# Patient Record
Sex: MALE | Race: ASIAN | Employment: FULL TIME | URBAN - METROPOLITAN AREA
[De-identification: names, ages, dates, MRNs, and addresses within clinical notes are randomized per-mention and may not be internally consistent; named-entity substitution may affect disease eponyms.]

---

## 2019-09-23 ENCOUNTER — OFFICE VISIT (OUTPATIENT)
Dept: FAMILY MEDICINE | Facility: CLINIC | Age: 29
End: 2019-09-23
Payer: COMMERCIAL

## 2019-09-23 VITALS
BODY MASS INDEX: 23.98 KG/M2 | HEART RATE: 91 BPM | OXYGEN SATURATION: 100 % | TEMPERATURE: 98.5 F | HEIGHT: 72 IN | SYSTOLIC BLOOD PRESSURE: 126 MMHG | DIASTOLIC BLOOD PRESSURE: 74 MMHG | WEIGHT: 177 LBS

## 2019-09-23 DIAGNOSIS — R63.4 WEIGHT LOSS: ICD-10-CM

## 2019-09-23 DIAGNOSIS — Z11.3 SCREEN FOR STD (SEXUALLY TRANSMITTED DISEASE): ICD-10-CM

## 2019-09-23 DIAGNOSIS — R53.83 OTHER FATIGUE: ICD-10-CM

## 2019-09-23 DIAGNOSIS — J02.9 SORE THROAT: Primary | ICD-10-CM

## 2019-09-23 DIAGNOSIS — Z83.3 FAMILY HISTORY OF DIABETES MELLITUS (DM): ICD-10-CM

## 2019-09-23 LAB
DEPRECATED S PYO AG THROAT QL EIA: NORMAL
ERYTHROCYTE [DISTWIDTH] IN BLOOD BY AUTOMATED COUNT: 12.2 % (ref 10–15)
HBA1C MFR BLD: 12 % (ref 0–5.6)
HCT VFR BLD AUTO: 41.9 % (ref 40–53)
HGB BLD-MCNC: 14.4 G/DL (ref 13.3–17.7)
MCH RBC QN AUTO: 28.4 PG (ref 26.5–33)
MCHC RBC AUTO-ENTMCNC: 34.4 G/DL (ref 31.5–36.5)
MCV RBC AUTO: 83 FL (ref 78–100)
PLATELET # BLD AUTO: 278 10E9/L (ref 150–450)
RBC # BLD AUTO: 5.07 10E12/L (ref 4.4–5.9)
SPECIMEN SOURCE: NORMAL
WBC # BLD AUTO: 11.3 10E9/L (ref 4–11)

## 2019-09-23 PROCEDURE — 84443 ASSAY THYROID STIM HORMONE: CPT | Performed by: FAMILY MEDICINE

## 2019-09-23 PROCEDURE — 87880 STREP A ASSAY W/OPTIC: CPT | Performed by: FAMILY MEDICINE

## 2019-09-23 PROCEDURE — 86803 HEPATITIS C AB TEST: CPT | Performed by: FAMILY MEDICINE

## 2019-09-23 PROCEDURE — 87389 HIV-1 AG W/HIV-1&-2 AB AG IA: CPT | Performed by: FAMILY MEDICINE

## 2019-09-23 PROCEDURE — 82306 VITAMIN D 25 HYDROXY: CPT | Performed by: FAMILY MEDICINE

## 2019-09-23 PROCEDURE — 83036 HEMOGLOBIN GLYCOSYLATED A1C: CPT | Performed by: FAMILY MEDICINE

## 2019-09-23 PROCEDURE — 87340 HEPATITIS B SURFACE AG IA: CPT | Performed by: FAMILY MEDICINE

## 2019-09-23 PROCEDURE — 87081 CULTURE SCREEN ONLY: CPT | Performed by: FAMILY MEDICINE

## 2019-09-23 PROCEDURE — 99204 OFFICE O/P NEW MOD 45 MIN: CPT | Performed by: FAMILY MEDICINE

## 2019-09-23 PROCEDURE — 36415 COLL VENOUS BLD VENIPUNCTURE: CPT | Performed by: FAMILY MEDICINE

## 2019-09-23 PROCEDURE — 87491 CHLMYD TRACH DNA AMP PROBE: CPT | Performed by: FAMILY MEDICINE

## 2019-09-23 PROCEDURE — 87591 N.GONORRHOEAE DNA AMP PROB: CPT | Performed by: FAMILY MEDICINE

## 2019-09-23 PROCEDURE — 80053 COMPREHEN METABOLIC PANEL: CPT | Performed by: FAMILY MEDICINE

## 2019-09-23 PROCEDURE — 85027 COMPLETE CBC AUTOMATED: CPT | Performed by: FAMILY MEDICINE

## 2019-09-23 ASSESSMENT — MIFFLIN-ST. JEOR: SCORE: 1810.87

## 2019-09-23 NOTE — PATIENT INSTRUCTIONS
Check labs  Cares and symptomatic treatment discussed.  Monitor weight    follow up if problem

## 2019-09-23 NOTE — PROGRESS NOTES
Subjective     Paul Christina is a 29 year old male who presents to clinic today for the following health issues:    HPI   RESPIRATORY SYMPTOMS/ALLERGIES      Duration: 1 week     Description    sore throat , feels itchy, does not hurt to swallow. No fever or chills. Cough slight mostly dry , sneezing     Severity: mild    Accompanying signs and symptoms: None    History (predisposing factors):  none    Precipitating or alleviating factors: None    Therapies tried and outcome:  OTC Nyquil help     Concern - Weight Loss   Onset: March 2019     Description:   Lost 20-25 ib since march of 2019 . No change diet although sometimes skip meals, no arthralgias, myalgias fever or chills. No change in bm. Stress at work. Sleep ok. He is feeling more tired lately and has lost weight despite not much change in diet or activity level , so concerned . No associated chest pain, palpitation change in bm etc. no urinary sx and urinating normal. No increased urinary frequency but wakes up at night to urinate only once but bc he drinks more water now.             Has FAM hx of DM in both parents, but he is trying to eat healthy physically active            Also wants std test . Not currently sex active but had partners previously. No other sx of urethral discharge, dysuria etc     There is no problem list on file for this patient.    Past Surgical History:   Procedure Laterality Date     NO HISTORY OF SURGERY         Social History     Tobacco Use     Smoking status: Never Smoker     Smokeless tobacco: Never Used   Substance Use Topics     Alcohol use: Yes     Family History   Problem Relation Age of Onset     Diabetes Mother      Diabetes Father      Coronary Artery Disease No family hx of      Hypertension No family hx of      Hyperlipidemia No family hx of      Cerebrovascular Disease No family hx of      Colon Cancer No family hx of      Prostate Cancer No family hx of            Reviewed and updated as needed this visit by  Provider         Review of Systems   ROS COMP: Constitutional, HEENT, cardiovascular, pulmonary, GI, , musculoskeletal, neuro, skin, endocrine and psych systems are negative, except as otherwise noted.      Objective    There were no vitals taken for this visit.  There is no height or weight on file to calculate BMI.  Physical Exam   GENERAL: healthy, alert and no distress  EYES: Eyes grossly normal to inspection, PERRL and conjunctivae and sclerae normal  HENT: ear canals and TM's normal, nose and mouth without ulcers or lesions  NECK: no adenopathy, no asymmetry, masses, or scars and thyroid normal to palpation  RESP: lungs clear to auscultation - no rales, rhonchi or wheezes  CV: regular rate and rhythm, normal S1 S2, no S3 or S4, no murmur, click or rub, no peripheral edema and peripheral pulses strong  MS: no edema            Assessment & Plan       Plan  (J02.9) Sore throat  (primary encounter diagnosis)  Comment:   Plan: Strep, Rapid Screen          Rapid strep negative, strep culture pending. Call and treat only if positive. In the meantime cares and symptomatic treatment discussed follow up if problem       (R63.4) Weight loss  Comment:   Plan: TSH with free T4 reflex, CBC with platelets,         Comprehensive metabolic panel, Hemoglobin A1c            (R53.83) Other fatigue  Comment:   Plan: TSH with free T4 reflex, Vitamin D Deficiency,         CBC with platelets, Comprehensive metabolic         panel, Hemoglobin A1c            (Z83.3) Family history of diabetes mellitus (DM)  Comment: Plan: Hemoglobin A1c            (Z11.3) Screen for STD (sexually transmitted disease)  Comment:   Plan: Hepatitis B surface antigen, NEISSERIA         GONORRHOEA PCR, CHLAMYDIA TRACHOMATIS PCR,         Hepatitis C Screen Reflex to HCV RNA Quant and         Genotype, HIV Antigen Antibody Combo                Check labs. refill sent.Cares and  concerns discussed.  follow up if problem. Need to monitor weight, talked about  healthy diet/ exercise etc. Also talked about std prevention etc    Patient expressed understanding and agreement with treatment plan. All patient's questions were answered, will let me know if has more later.  Spent 30 min with patient and more then 50% of the time spent counseling and coordination of cares         Neeta Almaraz MD  [facility] [place]

## 2019-09-24 ENCOUNTER — OFFICE VISIT (OUTPATIENT)
Dept: FAMILY MEDICINE | Facility: CLINIC | Age: 29
End: 2019-09-24
Payer: COMMERCIAL

## 2019-09-24 VITALS
HEART RATE: 86 BPM | SYSTOLIC BLOOD PRESSURE: 126 MMHG | OXYGEN SATURATION: 98 % | WEIGHT: 175 LBS | TEMPERATURE: 99.1 F | BODY MASS INDEX: 23.7 KG/M2 | HEIGHT: 72 IN | DIASTOLIC BLOOD PRESSURE: 78 MMHG

## 2019-09-24 DIAGNOSIS — E11.9 DIABETES MELLITUS, NEW ONSET (H): Primary | ICD-10-CM

## 2019-09-24 LAB
ALBUMIN SERPL-MCNC: 4.4 G/DL (ref 3.4–5)
ALP SERPL-CCNC: 101 U/L (ref 40–150)
ALT SERPL W P-5'-P-CCNC: 28 U/L (ref 0–70)
ANION GAP SERPL CALCULATED.3IONS-SCNC: 10 MMOL/L (ref 3–14)
AST SERPL W P-5'-P-CCNC: 11 U/L (ref 0–45)
BACTERIA SPEC CULT: NORMAL
BILIRUB SERPL-MCNC: 0.5 MG/DL (ref 0.2–1.3)
BUN SERPL-MCNC: 13 MG/DL (ref 7–30)
CALCIUM SERPL-MCNC: 9.1 MG/DL (ref 8.5–10.1)
CHLORIDE SERPL-SCNC: 103 MMOL/L (ref 94–109)
CO2 SERPL-SCNC: 21 MMOL/L (ref 20–32)
CREAT SERPL-MCNC: 0.9 MG/DL (ref 0.66–1.25)
DEPRECATED CALCIDIOL+CALCIFEROL SERPL-MC: 13 UG/L (ref 20–75)
GFR SERPL CREATININE-BSD FRML MDRD: >90 ML/MIN/{1.73_M2}
GLUCOSE SERPL-MCNC: 325 MG/DL (ref 70–99)
HBV SURFACE AG SERPL QL IA: NONREACTIVE
HCV AB SERPL QL IA: NONREACTIVE
HIV 1+2 AB+HIV1 P24 AG SERPL QL IA: NONREACTIVE
POTASSIUM SERPL-SCNC: 3.7 MMOL/L (ref 3.4–5.3)
PROT SERPL-MCNC: 8.1 G/DL (ref 6.8–8.8)
SODIUM SERPL-SCNC: 134 MMOL/L (ref 133–144)
SPECIMEN SOURCE: NORMAL
TSH SERPL DL<=0.005 MIU/L-ACNC: 0.84 MU/L (ref 0.4–4)

## 2019-09-24 PROCEDURE — 86337 INSULIN ANTIBODIES: CPT | Mod: 90 | Performed by: FAMILY MEDICINE

## 2019-09-24 PROCEDURE — 99214 OFFICE O/P EST MOD 30 MIN: CPT | Performed by: FAMILY MEDICINE

## 2019-09-24 PROCEDURE — 99000 SPECIMEN HANDLING OFFICE-LAB: CPT | Performed by: FAMILY MEDICINE

## 2019-09-24 PROCEDURE — 36415 COLL VENOUS BLD VENIPUNCTURE: CPT | Performed by: FAMILY MEDICINE

## 2019-09-24 PROCEDURE — 86341 ISLET CELL ANTIBODY: CPT | Mod: 90 | Performed by: FAMILY MEDICINE

## 2019-09-24 PROCEDURE — 84681 ASSAY OF C-PEPTIDE: CPT | Performed by: FAMILY MEDICINE

## 2019-09-24 PROCEDURE — 82043 UR ALBUMIN QUANTITATIVE: CPT | Performed by: FAMILY MEDICINE

## 2019-09-24 ASSESSMENT — MIFFLIN-ST. JEOR: SCORE: 1801.79

## 2019-09-24 NOTE — PROGRESS NOTES
Subjective     Paul Christina is a 29 year old male who presents to clinic today for the following health issues:    HPI   Concern - Recheck labs       Description:   Abnormal lab-    PROBLEMS TO ADD ON..  . Patient was seen recently for some ongoing fatigue and weight loss.  He had some labs done that shows his A1c is quite high( 12)  , and glucose was quite high ( 325) .  So he has been diagnosed with the diabetes.    Here to do a follow-up and discuss results and treatment.   Has family history diabetes in both parents.  Admits that he could do better with the diet and exercise, although he really has been paying attention to his eating habits lately.  He feels well otherwise denies any nausea vomiting abdominal pain dizziness etc. he is urinating normal.  He does admit to feeling thirsty.   Diabetes Follow-up      How often are you checking your blood sugar? Not at all    What time of day are you checking your blood sugars (select all that apply)?  na    Have you had any blood sugars above 200?  Na    Have you had any blood sugars below 70?  Na    What symptoms do you notice when your blood sugar is low?  None    What concerns do you have today about your diabetes? None and Other: Weight loss and fatigue,      Do you have any of these symptoms? (Select all that apply)  No numbness or tingling in feet.  No redness, sores or blisters on feet.  No complaints of excessive thirst.  No reports of blurry vision.  No significant changes to weight.     Have you had a diabetic eye exam in the last 12 months? No    BP Readings from Last 2 Encounters:   09/24/19 126/78   09/23/19 126/74     Hemoglobin A1C (%)   Date Value   09/23/2019 12.0 (H)       Diabetes Management Resources    Patient Active Problem List   Diagnosis     Diabetes mellitus, type 2 (H)     Past Surgical History:   Procedure Laterality Date     NO HISTORY OF SURGERY         Social History     Tobacco Use     Smoking status: Never Smoker     Smokeless  tobacco: Never Used   Substance Use Topics     Alcohol use: Yes     Family History   Problem Relation Age of Onset     Diabetes Mother      Diabetes Father      Coronary Artery Disease No family hx of      Hypertension No family hx of      Hyperlipidemia No family hx of      Cerebrovascular Disease No family hx of      Colon Cancer No family hx of      Prostate Cancer No family hx of            Reviewed and updated as needed this visit by Provider         Review of Systems   ROS COMP: Constitutional, HEENT, cardiovascular, pulmonary, GI, , musculoskeletal, neuro, skin, endocrine and psych systems are negative, except as otherwise noted.      Objective    There were no vitals taken for this visit.  There is no height or weight on file to calculate BMI.  Physical Exam   GENERAL: healthy, alert and no distress  EYES: Eyes grossly normal to inspection, PERRL and conjunctivae and sclerae normal  HENT: ear canals and TM's normal, nose and mouth without ulcers or lesions  NECK: no adenopathy, no asymmetry, masses, or scars and thyroid normal to palpation  RESP: lungs clear to auscultation - no rales, rhonchi or wheezes  CV: regular rate and rhythm, normal S1 S2, no S3 or S4, no murmur, click or . , no peripheral edema and peripheral pulses strong  ABDOMEN: soft, nontender, no hepatosplenomegaly, no masses and bowel sounds normal  MS: no edema  PSYCH: mentation appears normal, affect normal, speech pressured, judgement and insight intact and appearance well groomed  Foot exam : No lesion , normal sensation by monofilament. Normal peripheral pulses  .             Assessment & Plan     (E11.9) Diabetes mellitus, new onset (H)  (primary encounter diagnosis)  Comment:   Plan: C-peptide, Insulin antibody, Glutamic acid         decarboxylase antibody, ENDOCRINOLOGY ADULT         REFERRAL, AMBULATORY ADULT DIABETES EDUCATOR         REFERRAL, metFORMIN (GLUCOPHAGE) 500 MG tablet,        Albumin Random Urine Quantitative with  Alysia Loya       Discussed recent results , cares, diet/ exercise .   Talked about DM management , health risk etc. talked about potential type I or type 2 diabetes, he was willing to proceed with additional labs to determine that.  Gave him a referral to see the endocrinology as well as diabetic educator and he will schedule those appointments.  He was also reminded to schedule an appointment with the eye doctor.   Willing to start on metformin.  Pros and cons of the med's discussed   Check lab, call pt with results. Follow up in 2 to 3 weeks , sooner as needed        I did discuss standards of care for diabetes, the importance of early treatment and prevention of cardiovascular risks that  Info given on diet//diabetes  Spent 25 min with patient and more then 50% of the time spent counseling and coordination of cares       Return in about 2 weeks (around 10/8/2019), or sooner if problem , for Physical Exam.    Neeta Almaraz MD  [facility] – [place]

## 2019-09-24 NOTE — PATIENT INSTRUCTIONS
Patient Education   What Is Diabetes?  Diabetes is a disease that causes too much sugar in the blood. It's a lifelong disease. It doesn't go away, but you can manage it.   Managing diabetes means managing the sugar, or glucose, in your blood. When blood glucose is managed well, people with diabetes can live long, healthy lives.   What are the signs of diabetes?  You may have some, all or none of these problems:    Extreme thirst    Needing to pee (urinate) more often    Headache or blurred vision    Hunger    Feeling drowsy or tired    Slow healing after an illness or injury    Getting infections often  How does diabetes affect my body?  Your body's main source of energy is glucose, a kind of sugar. A hormone called insulin carries glucose from your blood into your body's cells.   If you have diabetes, your body doesn't make enough insulin, or the insulin it makes doesn't work the way it should. So, glucose builds up in your blood. This is called high blood glucose.  Over time, high blood glucose hurts blood vessels and nerves. It also makes it harder for your body to heal and fight infection. This can lead to serious problems in the eyes, kidneys, heart, legs and feet. Managing your blood glucose helps prevent these problems.  What should I do if I have diabetes?  Learn how to manage your diabetes. The goal is to keep your blood glucose as close to normal as possible. (Normal is 60 to 99.) This way, you can prevent or reduce damage to your body.   To manage your diabetes:    Eat a wide range of healthy foods.    Manage your weight.    Be physically active.    Check your blood glucose as prescribed.    Manage your blood pressure and cholesterol.    Take your medicines as prescribed.  Ask your doctor to write a referral so you can take diabetes classes. These classes are offered at your clinic or nearby hospital. The classes give you the tools and knowledge you need.   Are there different kinds of diabetes?  There  are two kinds of diabetes: type 1 and type 2.  Type 1 diabetes  Type 1 diabetes happens when your body's immune system destroys the cells that make insulin. Your body can't make insulin on its own. People who have type 1 diabetes must take insulin.  Type 2 diabetes  With type 2 diabetes, your body makes its own insulin. But it doesn't make enough, or the insulin it makes doesn't work well. This is the most common kind of diabetes. Often, people don't know they have type 2 diabetes until they get a routine blood test.  You are more likely to get type 2 diabetes if you:    Are overweight.    Have high blood pressure.    Have high cholesterol.    Are not physically active.    Have a family history of type 2 diabetes.    Are , /, ,  American or .    Have given birth to a baby weighing more than 9 pounds, or have gotten diabetes while pregnant (gestational diabetes).  To learn more  American Diabetes Association  www.diabetes.org  7-486-166-3235 (1-800-Diabetes)  National Diabetes Education Program  ndep.nih.gov  8-389-256-4888   For informational purposes only. Not to replace the advice of your health care provider. Copyright   2006 [employer] [facility]. All rights reserved. Clinically reviewed by [employer]. [employer] 836819 - REV 08/18.       Patient Education     Diet: Diabetes  Food is an important tool that you can use to control diabetes and stay healthy. Eating well-balanced meals in the correct amounts will help you control your blood glucose levels and prevent low blood sugar reactions. It will also help you reduce the health risks of diabetes. There is no one specific diet that is right for everyone with diabetes. But there are general guidelines to follow. A registered dietitian (RD) will create a tailored diet approach that s just right for you. He or she will also help you plan healthy meals and snacks. If you have any  questions, call your dietitian for advice.     Guidelines for success  Talk with your healthcare provider before starting a diabetes diet or weight loss program. If you haven't talked with a dietitian yet, ask your provider for a referral. The following guidelines can help you succeed:    Select foods from the 6 food groups below. Your dietitian will help you find food choices within each group. He or she will also show you serving sizes and how many servings you can have at each meal.  ? Grains, beans, and starchy vegetables  ? Vegetables  ? Fruit  ? Milk or yogurt  ? Meat, poultry, fish, or tofu  ? Healthy fats    Check your blood sugar levels as directed by your provider. Take any medicine as prescribed by your provider.    Learn to read food labels and pick the right portion sizes.    Eat only the amount of food in your meal plan. Eat about the same amount of food at regular times each day. Don t skip meals. Eat meals 4 to 5 hours apart, with snacks in between.    Limit alcohol. It raises blood sugar levels. Drink water or calorie-free diet drinks that use safe sweeteners.    Eat less fat to help lower your risk of heart disease. Use nonfat or low-fat dairy products and lean meats. Avoid fried foods. Use cooking oils that are unsaturated, such as olive, canola, or peanut oil.    Talk with your dietitian about safe sugar substitutes.    Avoid added salt. It can contribute to high blood pressure, which can cause heart disease. People with diabetes already have a risk of high blood pressure and heart disease.    Stay at a healthy weight. If you need to lose weight, cut down on your portion sizes. But don t skip meals. Exercise is an important part of any weight management program. Talk with your provider about an exercise program that s right for you.    For more information about the best diet plan for you, talk with a registered dietitian (RD). To find an RD in your area, contact:  ? Academy of Nutrition and  Dietetics www.eatright.org  ? The American Diabetes Association 730-340-2955 www.diabetes.org  Date Last Reviewed: 8/1/2016 2000-2018 [employer]. [address] [place] [address], [place] [address]. All rights reserved. This information is not intended as a substitute for professional medical care. Always follow your healthcare professional's instructions.           Patient Education     Healthy Meals for Diabetes     A healthcare provider will help you develop a meal plan that fits your needs.     Ask your healthcare team to help you make a meal plan that fits your needs. Your meal plan tells you when to eat your meals and snacks, what kinds of foods to eat, and how much of each food to eat. You don t have to give up all the foods you like. But you do need to follow some guidelines.  Choose healthy carbohydrates  Starches, sugars, and fiber are all types of carbohydrates. Fiber can help lower your cholesterol and triglycerides. Fiber is also healthy for your heart. You should have 20 to 35 grams of total fiber each day. Fiber-rich foods include:    Whole-grain breads and cereals    Bulgur wheat    Brown rice       Whole-wheat pasta    Fruits and vegetables    Dry beans, and peas   Keep track of the amount of carbohydrates you eat. This can help you keep the right balance of physical activity and medicine. The amount of carbohydrates needed will vary for each person. It depends on many things such as your health, the medicines you take, and how active you are. Your healthcare team will help you figure out the right amount of carbohydrates for you. You may start with around 45 to 60 grams of carbohydrates per meal, depending on your situation.   Here are some examples of foods containing about 15 grams of carbohydrates (1 serving of carbohydrates):    1/2 cup of canned or frozen fruit    A small piece of fresh fruit (4 ounces)    1 slice of bread    1/2 cup of oatmeal    1/3 cup of rice    4 to 6  crackers    1/2 English muffin    1/2 cup of black beans    1/4 of a large baked potato (3 ounces)    2/3 cup of plain fat-free yogurt    1 cup of soup    1/2 cup of casserole    6 chicken nuggets    2-inch-square brownie or cake without frosting    2 small cookies    1/2 cup of ice cream or sherbet  Choose healthy protein foods  Eating protein that is low in fat can help you control your weight. It also helps keep your heart healthy. Low-fat protein foods include:    Fish    Plant proteins, such as dry beans and peas, nuts, and soy products like tofu and soymilk    Lean meat with all visible fat removed    Poultry with the skin removed    Low-fat or nonfat milk, cheese, and yogurt  Limit unhealthy fats and sugar  Saturated and trans fats are unhealthy for your heart. They raise LDL (bad) cholesterol. Fat is also high in calories, so it can make you gain weight. To cut down on unhealthy fats and sugar, limit these foods:    Butter or margarine    Palm and palm kernel oils and coconut oil    Cream    Cheese    Gordon    Lunch meats       Ice cream    Sweet bakery goods such as pies, muffins, and donuts    Jams and jellies    Candy bars    Regular sodas   How much to eat  The amount of food you eat affects your blood sugar. It also affects your weight. Your healthcare team will tell you how much of each type of food you should eat.    Use measuring cups and spoons and a food scale to measure serving sizes.    Learn what a correct serving size looks like on your plate. This will help when you are away from home and can t measure your servings.    Eat only the number of servings given on your meal plan for each food. Don t take seconds.    Learn to read food labels. Be sure to look at serving size, total carbohydrates, fiber, calories, sugar, and saturated and trans fats. Look for healthier alternatives to foods that have added sugar.    Plan ahead for parties so you can still have a good time without going overboard  with unhealthy food choices. Set a good example yourself by bringing a healthy dish to pot lucks.   Choose healthy snacks  When it comes to snacks, we usually think about foods with added sugar and fats. But there are many other options for healthier snack choices. Here are a few snack ideas to choose from:  Snacks with less than 5 grams of carbohydrates    1 piece of string cheese    3 celery sticks plus 1 tablespoon of peanut butter    5 cherry tomatoes plus 1 tablespoon of ranch dressing    1 hard-boiled egg    1/4 cup of fresh blueberries     5 baby carrots    1 cup of light popcorn    1/2 cup of sugar-free gelatin    15 almonds  Snacks with about 10 to 20 grams of carbohydrates    1/3 cup of hummus plus 1 cup of fresh cut nonstarchy vegetables (carrots, green peppers, broccoli, celery, or a combination)    1/2 cup of fresh or canned fruit plus 1/4 cup of cottage cheese    1/2 cup of tuna salad with 4 crackers    2 rice cakes and a tablespoon of peanut butter    1 small apple or orange    3 cups light popcorn    1/2 of a turkey sandwich (1 slice of whole-wheat bread, 2 ounces of turkey, and mustard)  Portion sizes are important to controlling your blood sugar and staying at a healthy weight. Stock up on healthy snack items so you always have them on hand.  When to eat  Your meal plan will likely include breakfast, lunch, dinner, and some snacks.    Try to eat your meals and snacks at about the same times each day.    Eat all your meals and snacks. Skipping a meal or snack can make your blood sugar drop too low. It can also cause you to eat too much at the next meal or snack. Then your blood sugar could get too high.  Date Last Reviewed: 7/1/2016 2000-2018 [employer]. [address] [place] [address], [place] [address]. All rights reserved. This information is not intended as a substitute for professional medical care. Always follow your healthcare professional's instructions.

## 2019-09-25 ENCOUNTER — TELEPHONE (OUTPATIENT)
Dept: FAMILY MEDICINE | Facility: CLINIC | Age: 29
End: 2019-09-25

## 2019-09-25 LAB
C PEPTIDE SERPL-MCNC: 4.5 NG/ML (ref 0.9–6.9)
C TRACH DNA SPEC QL NAA+PROBE: NEGATIVE
CREAT UR-MCNC: 293 MG/DL
MICROALBUMIN UR-MCNC: 164 MG/L
MICROALBUMIN/CREAT UR: 55.97 MG/G CR (ref 0–17)
N GONORRHOEA DNA SPEC QL NAA+PROBE: NEGATIVE
SPECIMEN SOURCE: NORMAL
SPECIMEN SOURCE: NORMAL

## 2019-09-25 NOTE — TELEPHONE ENCOUNTER
Please make sure to do follow up call next week if he does not call back. I thinks he is just a little overwhelmed with his new diagnosis. Do let me know if he does not follow up, then I will call him to touch base with him again

## 2019-09-25 NOTE — TELEPHONE ENCOUNTER
Diabetes Education Scheduling Outreach #1:    Call to patient to schedule. Patient declined to schedule and will call us back if and when he is ready to schedule.    Nguyen Angulo  [facility]  Diabetes and Nutrition Scheduling

## 2019-09-26 PROBLEM — E11.9 DIABETES MELLITUS, TYPE 2 (H): Status: RESOLVED | Noted: 2019-09-24 | Resolved: 2019-09-26

## 2019-09-26 PROBLEM — E11.9 DIABETES MELLITUS, NEW ONSET (H): Status: ACTIVE | Noted: 2019-09-26

## 2019-09-26 LAB — GAD65 AB SER IA-ACNC: <5 IU/ML (ref 0–5)

## 2019-09-27 LAB — INSULIN HUMAN AB SER-ACNC: <0.4 U/ML (ref 0–0.4)

## 2019-10-02 NOTE — TELEPHONE ENCOUNTER
Diabetes Education Scheduling Outreach #2:    Call to patient to schedule. Patient declined to schedule and stated that he wants to see an endocrinologist. Informed him he may be able to be seen in [place] sooner. Provided scheduling number for Janet Zabala.    [facility]guyen [facility]  Di[facility] and [facility] Scheduling

## 2019-10-03 ENCOUNTER — TELEPHONE (OUTPATIENT)
Dept: FAMILY MEDICINE | Facility: CLINIC | Age: 29
End: 2019-10-03

## 2019-10-03 DIAGNOSIS — E11.9 DIABETES MELLITUS, NEW ONSET (H): Primary | ICD-10-CM

## 2019-10-03 NOTE — TELEPHONE ENCOUNTER
Jojo from Endocrinology calling in stating they do not take the patients insurance, Jojo states she spoke with the patient and advised him of this multiple times and that he would need to obtain a new referral to a Endocrinology clinic that would except his insurance. Any questions please call Jojo at [phone].      .Domitila AGUSTIN    [facility] [place]

## 2019-10-03 NOTE — TELEPHONE ENCOUNTER
Contacted patient and advised or referral placed and gave phone number to contact.      .Domitila AGUSTIN    [facility]

## 2019-10-03 NOTE — TELEPHONE ENCOUNTER
here's another couple of recommendation, referral placed. Pt needs to verify with insurance for coverage

## 2019-10-03 NOTE — TELEPHONE ENCOUNTER
RECORDS RECEIVED FROM: INTERNAL   DATE RECEIVED: 12/05/2019   NOTES (FOR ALL VISITS) STATUS DETAILS   OFFICE NOTES from referring provider Internal 09/25/2019   OFFICE NOTES from other specialist Internal 09/24/2019   ED NOTES N/A    OPERATIVE REPORT  (thyroid, pituitary, adrenal, parathyroid) N/A    MEDICATION LIST Internal metFORMIN   IMAGING      DEXASCAN N/A    MRI (BRAIN) N/A    XR (Chest) N/A    CT (HEAD/NECK/CHEST/ABDOMEN) N/A    NUCLEAR  N/A    ULTRASOUND (HEAD/NECK) N/A    LABS     DIABETES: HBGA1C, CREATININE, FASTING LIPIDS, MICROALBUMIN URINE, POTASSIUM, TSH, T4    THYROID: TSH, T4, CBC, THYRODLONULIN, TOTAL T3, FREE T4, CALCITONIN, CEA Internal   09/24/2019 09/23/2019

## 2019-10-07 ENCOUNTER — DOCUMENTATION ONLY (OUTPATIENT)
Dept: CARE COORDINATION | Facility: CLINIC | Age: 29
End: 2019-10-07

## 2019-10-10 NOTE — TELEPHONE ENCOUNTER
Called patient. Patient stated that there were no appointments available with endocrinology in [place] until December. He is going to see an endocrinologist in [place]. He wants us to cancel these referrals for now. He will contact us for future scheduling if needed.    Nguyen BEATTY  Central Scheduler

## 2019-12-04 ENCOUNTER — PRE VISIT (OUTPATIENT)
Dept: ENDOCRINOLOGY | Facility: CLINIC | Age: 29
End: 2019-12-04

## 2020-03-11 ENCOUNTER — HEALTH MAINTENANCE LETTER (OUTPATIENT)
Age: 30
End: 2020-03-11

## 2021-01-03 ENCOUNTER — HEALTH MAINTENANCE LETTER (OUTPATIENT)
Age: 31
End: 2021-01-03

## 2021-04-25 ENCOUNTER — HEALTH MAINTENANCE LETTER (OUTPATIENT)
Age: 31
End: 2021-04-25

## 2021-08-15 ENCOUNTER — HEALTH MAINTENANCE LETTER (OUTPATIENT)
Age: 31
End: 2021-08-15

## 2021-10-10 ENCOUNTER — HEALTH MAINTENANCE LETTER (OUTPATIENT)
Age: 31
End: 2021-10-10

## 2021-11-01 ENCOUNTER — OFFICE VISIT (OUTPATIENT)
Dept: FAMILY MEDICINE | Facility: CLINIC | Age: 31
End: 2021-11-01
Payer: COMMERCIAL

## 2021-11-01 VITALS
HEIGHT: 71 IN | DIASTOLIC BLOOD PRESSURE: 89 MMHG | BODY MASS INDEX: 27.83 KG/M2 | HEART RATE: 88 BPM | TEMPERATURE: 97.8 F | SYSTOLIC BLOOD PRESSURE: 138 MMHG | OXYGEN SATURATION: 100 % | WEIGHT: 198.8 LBS

## 2021-11-01 DIAGNOSIS — R73.09 ELEVATED GLUCOSE: Primary | ICD-10-CM

## 2021-11-01 DIAGNOSIS — B37.9 CANDIDA INFECTION: ICD-10-CM

## 2021-11-01 DIAGNOSIS — R73.03 PREDIABETES: ICD-10-CM

## 2021-11-01 DIAGNOSIS — L08.9 LOCAL INFECTION OF SKIN AND SUBCUTANEOUS TISSUE: ICD-10-CM

## 2021-11-01 PROCEDURE — 99214 OFFICE O/P EST MOD 30 MIN: CPT | Performed by: FAMILY MEDICINE

## 2021-11-01 RX ORDER — MICONAZOLE NITRATE 20 MG/G
CREAM TOPICAL 2 TIMES DAILY
Qty: 30 G | Refills: 0 | Status: SHIPPED | OUTPATIENT
Start: 2021-11-01 | End: 2021-11-15

## 2021-11-01 RX ORDER — CEPHALEXIN 500 MG/1
500 CAPSULE ORAL 3 TIMES DAILY
Qty: 30 CAPSULE | Refills: 0 | Status: SHIPPED | OUTPATIENT
Start: 2021-11-01 | End: 2021-11-11

## 2021-11-01 ASSESSMENT — MIFFLIN-ST. JEOR: SCORE: 1889.88

## 2021-11-01 NOTE — PROGRESS NOTES
"  Assessment & Plan     (L08.9) Local infection of skin and subcutaneous tissue  Comment:,Balanitis, recurrent problem/ tight foreskin   Plan: cephALEXin (KEFLEX) 500 MG capsule            (B37.9) Candida infection  Comment: penile skin , also suspect fungal element per hx   Plan: miconazole (MICATIN) 2 % external cream    (R73.09) Elevated glucose  (primary encounter diagnosis)  Comment:   Plan: Hemoglobin A1c, Comprehensive metabolic panel,         CANCELED: Hemoglobin AC    (R73.03) Prediabetes  Comment: per most recent labs from [place]   Plan:          Pt with previous hx of elevated glucose possible DM suspicion early on , although he had his work up done in [place] and his sugars have been now diet control and I  Pre-diabetes range, per pt. Last AC was 5.9 may be 6 months ago .   He declined labs today bc of cost. Potential Diabetes Cares , concerns discussed.   Although  He is comfortable watching and he will do periodic follow up here as needed      Scrip sent.Cares and  treatment discussed. He will  follow up if problem   Patient expressed understanding and agreement with treatment plan. All patient's questions were answered, will let me know if has more later.  Medications: Rx's: Reviewed the potential side effects/complications of medications prescribed.     BMI:   Estimated body mass index is 27.43 kg/m  as calculated from the following:    Height as of this encounter: 1.813 m (5' 11.38\").    Weight as of this encounter: 90.2 kg (198 lb 12.8 oz).   Weight management plan noted, stable and monitoring      Neeta Almaraz MD  [facility] EMRE Miller is a 31 year old who presents for the following health issues     HPI     Genitourinary - Male  Onset/Duration: 10 days ago. Patient noted that his foreskin is swollen and that he also has been having difficulty retracting the foreskin. Had similar problem couple of years ago   Description:   Dysuria (painful urination): " no}  Hematuria (blood in urine): no  Frequency: YES  Waking at night to urinate: no  Hesitancy (delay in urine): no  Retention (unable to empty): no  Decrease in urinary flow: no  Incontinence: no  Progression of Symptoms:  Same.  itching and redness in penis area foreskin feel swollen and has white patches so just concerned with infection    he just came form [facility] after a vacation. He  had similar problem in [facility] couple of years bc  he was in ocean etc, so feels like same thing again. He has tight foreskin and had seen urology previously but they were ok with it and he did not recommend any surgery or circumcision and he has done well since until this  time , and sx started after being in ocean in [facility]    Accompanying Signs & Symptoms:  He is  and wife has no sx  And she was not on vacation with him   Fever: no  Back/Flank pain: no  Urethral discharge: no  Testicle lumps/masses/pain: no  Nausea and/or vomiting: no  Abdominal pain: no  History:   History of frequent UTI s: YES   History of kidney stones: no  History of hernias: no  Personal or Family history of Prostate problems: no  Sexually active: YES  Precipitating or alleviating factors: None  Therapies tried and outcome: increase fluid intake and UTI pain relief        Diabetes Follow-up      He was seen in 09/23/2019 with similar sx and his A1C was at 12.1, so diagnosed with DM       per pt he did not follow up here but he went back to [place] and he was ok and got tested back [place] for DM and his test all improved. He is not on any med's       and mostly diet control - his last A1C was 5.9 , although  no labs for last 6 month        How often are you checking your blood sugar? Not at all    What concerns do you have today about your diabetes? None     Do you have any of these symptoms? (Select all that apply)  No numbness or tingling in feet.  No redness, sores or blisters on feet.  No complaints of excessive thirst.  No reports of  "blurry vision.  No significant changes to weight.    Have you had a diabetic eye exam in the last 12 months? No        BP Readings from Last 2 Encounters:   11/01/21 138/89   09/24/19 126/78     Hemoglobin A1C (%)   Date Value   09/23/2019 12.0 (H)                   Review of Systems   Constitutional, HEENT, cardiovascular, pulmonary, GI, , musculoskeletal, neuro, skin, endocrine and psych systems are negative, except as otherwise noted.      Objective    /89 (BP Location: Left arm, Cuff Size: Adult Regular)   Pulse 88   Temp 97.8  F (36.6  C) (Tympanic)   Ht 1.813 m (5' 11.38\")   Wt 90.2 kg (198 lb 12.8 oz)   SpO2 100%   BMI 27.43 kg/m    Body mass index is 27.43 kg/m .  Physical Exam   GENERAL: healthy, alert and no distress  EYES: Eyes grossly normal to inspection,  HENT: oropharynx clear and oral mucous membranes moist  RESP: lungs clear to auscultation - no rales, rhonchi or wheezes  CV: regular rate and rhythm, normal S1 S2, no S3 or S4,  ABDOMEN: soft, nontender, no hepatosplenomegaly, no masses and bowel sounds normal   (male): testicles normal without atrophy or masses, no hernias and penile tip with erythematous and slightly inflamed foreskin although uncomfortable but retractable to certain extent  but no obvious urethral discharge  MS: no edema  PSYCH: mentation appears normal, affect normal/bright      "

## 2021-11-02 PROBLEM — E11.9 DIABETES MELLITUS, NEW ONSET (H): Status: RESOLVED | Noted: 2019-09-26 | Resolved: 2021-11-02

## 2021-11-02 PROBLEM — R73.03 PREDIABETES: Status: ACTIVE | Noted: 2021-11-02

## 2021-11-02 PROBLEM — L08.9 LOCAL INFECTION OF SKIN AND SUBCUTANEOUS TISSUE: Status: ACTIVE | Noted: 2021-11-02

## 2021-12-05 ENCOUNTER — HEALTH MAINTENANCE LETTER (OUTPATIENT)
Age: 31
End: 2021-12-05

## 2022-03-26 ENCOUNTER — HEALTH MAINTENANCE LETTER (OUTPATIENT)
Age: 32
End: 2022-03-26

## 2022-05-22 ENCOUNTER — HEALTH MAINTENANCE LETTER (OUTPATIENT)
Age: 32
End: 2022-05-22

## 2022-07-16 ENCOUNTER — HEALTH MAINTENANCE LETTER (OUTPATIENT)
Age: 32
End: 2022-07-16

## 2022-09-18 ENCOUNTER — HEALTH MAINTENANCE LETTER (OUTPATIENT)
Age: 32
End: 2022-09-18

## 2023-06-04 ENCOUNTER — HEALTH MAINTENANCE LETTER (OUTPATIENT)
Age: 33
End: 2023-06-04

## 2024-07-14 ENCOUNTER — HEALTH MAINTENANCE LETTER (OUTPATIENT)
Age: 34
End: 2024-07-14